# Patient Record
Sex: FEMALE | Race: WHITE | NOT HISPANIC OR LATINO | URBAN - METROPOLITAN AREA
[De-identification: names, ages, dates, MRNs, and addresses within clinical notes are randomized per-mention and may not be internally consistent; named-entity substitution may affect disease eponyms.]

---

## 2023-05-15 DIAGNOSIS — I15.9 SECONDARY HYPERTENSION: Primary | ICD-10-CM

## 2023-05-16 PROBLEM — F99 INSOMNIA DUE TO OTHER MENTAL DISORDER: Status: ACTIVE | Noted: 2023-05-16

## 2023-05-16 PROBLEM — M06.9 RHEUMATOID ARTHRITIS (MULTI): Status: ACTIVE | Noted: 2023-05-16

## 2023-05-16 PROBLEM — J30.2 SEASONAL ALLERGIES: Status: ACTIVE | Noted: 2023-05-16

## 2023-05-16 PROBLEM — K62.1 RECTAL POLYP: Status: ACTIVE | Noted: 2023-05-16

## 2023-05-16 PROBLEM — D75.89 MACROCYTOSIS: Status: ACTIVE | Noted: 2023-05-16

## 2023-05-16 PROBLEM — M81.0 OSTEOPOROSIS: Status: ACTIVE | Noted: 2023-05-16

## 2023-05-16 PROBLEM — I10 HYPERTENSION: Status: ACTIVE | Noted: 2023-05-16

## 2023-05-16 PROBLEM — M25.559 HIP PAIN: Status: ACTIVE | Noted: 2023-05-16

## 2023-05-16 PROBLEM — F51.05 INSOMNIA DUE TO OTHER MENTAL DISORDER: Status: ACTIVE | Noted: 2023-05-16

## 2023-05-16 RX ORDER — FLUTICASONE PROPIONATE 50 MCG
2 SPRAY, SUSPENSION (ML) NASAL DAILY
COMMUNITY
Start: 2021-10-25 | End: 2023-08-29 | Stop reason: SDUPTHER

## 2023-05-16 RX ORDER — CYCLOBENZAPRINE HCL 10 MG
10 TABLET ORAL NIGHTLY PRN
COMMUNITY
Start: 2022-06-07 | End: 2023-08-29

## 2023-05-16 RX ORDER — LOSARTAN POTASSIUM 50 MG/1
1 TABLET ORAL DAILY
COMMUNITY
Start: 2018-12-12 | End: 2023-06-06

## 2023-05-16 RX ORDER — HYDROCHLOROTHIAZIDE 25 MG/1
1 TABLET ORAL DAILY
COMMUNITY
Start: 2022-07-27 | End: 2023-08-29

## 2023-05-16 RX ORDER — AMLODIPINE BESYLATE 5 MG/1
1 TABLET ORAL DAILY
COMMUNITY
Start: 2018-06-27 | End: 2023-08-29 | Stop reason: SDUPTHER

## 2023-05-16 RX ORDER — AZELASTINE HCL 205.5 UG/1
SPRAY NASAL
COMMUNITY
Start: 2018-07-09 | End: 2023-08-29

## 2023-05-16 RX ORDER — AZELASTINE HYDROCHLORIDE 0.5 MG/ML
SOLUTION/ DROPS OPHTHALMIC EVERY 12 HOURS
COMMUNITY
Start: 2018-09-04 | End: 2023-08-29 | Stop reason: SDUPTHER

## 2023-05-16 RX ORDER — HYDROXYCHLOROQUINE SULFATE 200 MG/1
1 TABLET, FILM COATED ORAL DAILY
COMMUNITY

## 2023-05-16 RX ORDER — LEFLUNOMIDE 20 MG/1
TABLET ORAL
COMMUNITY
Start: 2018-10-02

## 2023-05-16 RX ORDER — MELOXICAM 15 MG/1
1 TABLET ORAL DAILY
COMMUNITY
Start: 2018-06-27 | End: 2023-06-06

## 2023-05-16 RX ORDER — ALBUTEROL SULFATE 90 UG/1
AEROSOL, METERED RESPIRATORY (INHALATION)
COMMUNITY
Start: 2018-09-04 | End: 2023-08-29 | Stop reason: SDUPTHER

## 2023-05-17 RX ORDER — AMLODIPINE BESYLATE 5 MG/1
5 TABLET ORAL DAILY
Qty: 90 TABLET | Refills: 0 | Status: SHIPPED | OUTPATIENT
Start: 2023-05-17 | End: 2023-08-09

## 2023-05-17 NOTE — TELEPHONE ENCOUNTER
Please schedule an appointment for review   of all med problems ...have sent in 90-day supply.  In the event that you cannot get in before this 90-day supply please call my office.Thanks much Dr. Juárez.

## 2023-06-06 DIAGNOSIS — M06.9 RHEUMATOID ARTHRITIS, INVOLVING UNSPECIFIED SITE, UNSPECIFIED WHETHER RHEUMATOID FACTOR PRESENT (MULTI): ICD-10-CM

## 2023-06-06 DIAGNOSIS — I10 HYPERTENSION, UNSPECIFIED TYPE: ICD-10-CM

## 2023-06-06 RX ORDER — MELOXICAM 15 MG/1
TABLET ORAL
Qty: 90 TABLET | Refills: 0 | Status: SHIPPED | OUTPATIENT
Start: 2023-06-06 | End: 2023-08-29 | Stop reason: SDUPTHER

## 2023-06-06 RX ORDER — LOSARTAN POTASSIUM 50 MG/1
TABLET ORAL
Qty: 90 TABLET | Refills: 0 | Status: SHIPPED | OUTPATIENT
Start: 2023-06-06 | End: 2023-08-29 | Stop reason: SDUPTHER

## 2023-06-07 NOTE — TELEPHONE ENCOUNTER
[facility] [phone]. [facility]. [facility]  
Please schedule an appointment for review   of all med problems ...have sent in 90-day supply.  In the event that you cannot get in before this 90-day supply please call my office.Thanks much Dr. Juárez.  
Patient expressed no known problems or needs

## 2023-06-08 DIAGNOSIS — M06.9 RHEUMATOID ARTHRITIS, INVOLVING UNSPECIFIED SITE, UNSPECIFIED WHETHER RHEUMATOID FACTOR PRESENT (MULTI): ICD-10-CM

## 2023-06-08 DIAGNOSIS — I10 HYPERTENSION, UNSPECIFIED TYPE: ICD-10-CM

## 2023-06-14 RX ORDER — MELOXICAM 15 MG/1
TABLET ORAL
Qty: 90 TABLET | Refills: 3 | OUTPATIENT
Start: 2023-06-14

## 2023-06-14 RX ORDER — LOSARTAN POTASSIUM 50 MG/1
TABLET ORAL
Qty: 90 TABLET | Refills: 3 | OUTPATIENT
Start: 2023-06-14

## 2023-06-14 NOTE — TELEPHONE ENCOUNTER
Called pt three times and lvmtcb about getting set up for an appt to review meds, there was another message where these refills were already approved by Dr. Juárez

## 2023-08-08 DIAGNOSIS — I15.9 SECONDARY HYPERTENSION: ICD-10-CM

## 2023-08-09 PROBLEM — M79.621 PAIN IN RIGHT UPPER ARM: Status: ACTIVE | Noted: 2022-06-07

## 2023-08-09 PROBLEM — M25.642 STIFFNESS OF LEFT HAND JOINT: Status: ACTIVE | Noted: 2017-02-13

## 2023-08-09 PROBLEM — J45.20 MILD INTERMITTENT ASTHMA WITHOUT COMPLICATION (HHS-HCC): Status: ACTIVE | Noted: 2017-08-31

## 2023-08-09 PROBLEM — M79.642 LEFT HAND PAIN: Status: ACTIVE | Noted: 2017-02-13

## 2023-08-09 PROBLEM — S46.211A STRAIN OF RIGHT BICEPS TENDON: Status: ACTIVE | Noted: 2022-06-07

## 2023-08-09 PROBLEM — G89.18 POST-OP PAIN: Status: ACTIVE | Noted: 2017-01-06

## 2023-08-09 PROBLEM — R29.898 WEAKNESS OF LEFT HAND: Status: ACTIVE | Noted: 2017-04-04

## 2023-08-09 RX ORDER — IBUPROFEN 100 MG/5ML
1000 SUSPENSION, ORAL (FINAL DOSE FORM) ORAL
COMMUNITY

## 2023-08-09 RX ORDER — CYANOCOBALAMIN (VITAMIN B-12) 250 MCG
TABLET ORAL
COMMUNITY

## 2023-08-09 RX ORDER — AMLODIPINE BESYLATE 5 MG/1
5 TABLET ORAL DAILY
Qty: 30 TABLET | Refills: 1 | Status: SHIPPED | OUTPATIENT
Start: 2023-08-09 | End: 2023-08-29 | Stop reason: SDUPTHER

## 2023-08-09 RX ORDER — BISOPROLOL FUMARATE AND HYDROCHLOROTHIAZIDE 2.5; 6.25 MG/1; MG/1
1 TABLET ORAL
COMMUNITY
End: 2023-08-29 | Stop reason: SDUPTHER

## 2023-08-09 NOTE — TELEPHONE ENCOUNTER
Please schedule an appointment for medicine is gone have sent in 30-day supply.  In the event that you cannot get in before this 30-day supply please call my office.Thanks much Dr. Juárez.

## 2023-08-10 NOTE — TELEPHONE ENCOUNTER
Called patient at [phone] 08/10 @ 9739 spoke w/ patient. She will CB to schedule. -LUIS ANTONIO

## 2023-08-21 DIAGNOSIS — I10 HYPERTENSION, UNSPECIFIED TYPE: ICD-10-CM

## 2023-08-21 DIAGNOSIS — M06.9 RHEUMATOID ARTHRITIS, INVOLVING UNSPECIFIED SITE, UNSPECIFIED WHETHER RHEUMATOID FACTOR PRESENT (MULTI): ICD-10-CM

## 2023-08-23 RX ORDER — MELOXICAM 15 MG/1
TABLET ORAL
Qty: 90 TABLET | Refills: 3 | OUTPATIENT
Start: 2023-08-23

## 2023-08-23 RX ORDER — LOSARTAN POTASSIUM 50 MG/1
TABLET ORAL
Qty: 90 TABLET | Refills: 3 | OUTPATIENT
Start: 2023-08-23

## 2023-08-28 ASSESSMENT — ENCOUNTER SYMPTOMS
HEADACHES: 0
SWEATS: 0
NECK PAIN: 0
ORTHOPNEA: 0
SHORTNESS OF BREATH: 0
BLURRED VISION: 0
HYPERTENSION: 1
PALPITATIONS: 0
PND: 0

## 2023-08-29 ENCOUNTER — OFFICE VISIT (OUTPATIENT)
Dept: PRIMARY CARE | Facility: CLINIC | Age: 63
End: 2023-08-29
Payer: COMMERCIAL

## 2023-08-29 ENCOUNTER — TELEPHONE (OUTPATIENT)
Dept: PRIMARY CARE | Facility: CLINIC | Age: 63
End: 2023-08-29

## 2023-08-29 VITALS
HEART RATE: 103 BPM | TEMPERATURE: 97.6 F | HEIGHT: 65 IN | DIASTOLIC BLOOD PRESSURE: 70 MMHG | WEIGHT: 117 LBS | BODY MASS INDEX: 19.49 KG/M2 | RESPIRATION RATE: 16 BRPM | SYSTOLIC BLOOD PRESSURE: 126 MMHG | OXYGEN SATURATION: 97 %

## 2023-08-29 DIAGNOSIS — Z12.31 BREAST CANCER SCREENING BY MAMMOGRAM: ICD-10-CM

## 2023-08-29 DIAGNOSIS — M06.9 RHEUMATOID ARTHRITIS, INVOLVING UNSPECIFIED SITE, UNSPECIFIED WHETHER RHEUMATOID FACTOR PRESENT (MULTI): ICD-10-CM

## 2023-08-29 DIAGNOSIS — F17.219 CIGARETTE NICOTINE DEPENDENCE WITH NICOTINE-INDUCED DISORDER: ICD-10-CM

## 2023-08-29 DIAGNOSIS — I10 HYPERTENSION, UNSPECIFIED TYPE: ICD-10-CM

## 2023-08-29 DIAGNOSIS — K62.1 RECTAL POLYP: Primary | ICD-10-CM

## 2023-08-29 DIAGNOSIS — J30.89 ALLERGIC RHINITIS DUE TO OTHER ALLERGIC TRIGGER, UNSPECIFIED SEASONALITY: ICD-10-CM

## 2023-08-29 PROBLEM — E87.1 HYPONATREMIA: Status: ACTIVE | Noted: 2023-08-29

## 2023-08-29 PROCEDURE — 3074F SYST BP LT 130 MM HG: CPT | Performed by: FAMILY MEDICINE

## 2023-08-29 PROCEDURE — 90471 IMMUNIZATION ADMIN: CPT | Performed by: FAMILY MEDICINE

## 2023-08-29 PROCEDURE — 99214 OFFICE O/P EST MOD 30 MIN: CPT | Performed by: FAMILY MEDICINE

## 2023-08-29 PROCEDURE — 90662 IIV NO PRSV INCREASED AG IM: CPT | Performed by: FAMILY MEDICINE

## 2023-08-29 PROCEDURE — 3078F DIAST BP <80 MM HG: CPT | Performed by: FAMILY MEDICINE

## 2023-08-29 RX ORDER — BISOPROLOL FUMARATE AND HYDROCHLOROTHIAZIDE 2.5; 6.25 MG/1; MG/1
1 TABLET ORAL
Qty: 90 TABLET | Refills: 2 | Status: SHIPPED | OUTPATIENT
Start: 2023-08-29 | End: 2023-08-30

## 2023-08-29 RX ORDER — LOSARTAN POTASSIUM 50 MG/1
50 TABLET ORAL DAILY
Qty: 90 TABLET | Refills: 2 | Status: SHIPPED | OUTPATIENT
Start: 2023-08-29 | End: 2023-08-30 | Stop reason: SDUPTHER

## 2023-08-29 RX ORDER — VARENICLINE TARTRATE 1 MG/1
1 TABLET, FILM COATED ORAL 2 TIMES DAILY
Qty: 180 TABLET | Refills: 0 | Status: SHIPPED | OUTPATIENT
Start: 2023-08-29 | End: 2023-08-29

## 2023-08-29 RX ORDER — ALBUTEROL SULFATE 90 UG/1
1 AEROSOL, METERED RESPIRATORY (INHALATION) EVERY 4 HOURS PRN
Qty: 18 G | Refills: 2 | Status: SHIPPED | OUTPATIENT
Start: 2023-08-29 | End: 2024-02-12

## 2023-08-29 RX ORDER — AMLODIPINE BESYLATE 5 MG/1
5 TABLET ORAL DAILY
Qty: 90 TABLET | Refills: 2 | Status: SHIPPED | OUTPATIENT
Start: 2023-08-29 | End: 2023-08-30 | Stop reason: SDUPTHER

## 2023-08-29 RX ORDER — MELOXICAM 15 MG/1
15 TABLET ORAL DAILY
Qty: 90 TABLET | Refills: 2 | Status: SHIPPED | OUTPATIENT
Start: 2023-08-29 | End: 2024-04-22

## 2023-08-29 RX ORDER — VARENICLINE TARTRATE 1 MG/1
1 TABLET, FILM COATED ORAL 2 TIMES DAILY
Qty: 180 TABLET | Refills: 0 | Status: SHIPPED | OUTPATIENT
Start: 2023-08-29 | End: 2023-10-30 | Stop reason: SDUPTHER

## 2023-08-29 RX ORDER — HYDROCHLOROTHIAZIDE 25 MG/1
25 TABLET ORAL DAILY
Qty: 90 TABLET | Refills: 1 | Status: SHIPPED | OUTPATIENT
Start: 2023-08-29 | End: 2023-08-30 | Stop reason: SDUPTHER

## 2023-08-29 RX ORDER — AZELASTINE HYDROCHLORIDE 0.5 MG/ML
1 SOLUTION/ DROPS OPHTHALMIC 2 TIMES DAILY
Qty: 3 ML | Refills: 2 | Status: SHIPPED | OUTPATIENT
Start: 2023-08-29 | End: 2024-05-20 | Stop reason: SDUPTHER

## 2023-08-29 RX ORDER — VARENICLINE TARTRATE 0.5 (11)-1
0.5 KIT ORAL 2 TIMES DAILY
Qty: 60 EACH | Refills: 1 | Status: SHIPPED | OUTPATIENT
Start: 2023-08-29 | End: 2023-10-30 | Stop reason: SDUPTHER

## 2023-08-29 RX ORDER — FLUTICASONE PROPIONATE 50 MCG
2 SPRAY, SUSPENSION (ML) NASAL DAILY
Qty: 16 G | Refills: 2 | Status: SHIPPED | OUTPATIENT
Start: 2023-08-29 | End: 2024-02-13 | Stop reason: SDUPTHER

## 2023-08-29 ASSESSMENT — ENCOUNTER SYMPTOMS
ORTHOPNEA: 0
SWEATS: 0
BLURRED VISION: 0
NECK PAIN: 0
PALPITATIONS: 0
SHORTNESS OF BREATH: 0
HEADACHES: 0
PND: 0
HYPERTENSION: 1

## 2023-08-29 NOTE — ASSESSMENT & PLAN NOTE
Blood pressure and with use of meds has good numbers at home 120/80 does admit to whitecoat but stable

## 2023-08-29 NOTE — ASSESSMENT & PLAN NOTE
Discussed low sodium as a complication of water pill however minimal and encouraged we will monitor

## 2023-08-29 NOTE — TELEPHONE ENCOUNTER
Pt said in office you told her NOT to stop taking the hydrochlorothiazide 25mg  But on her visit summary it says to stop taking that medication and you didn't refill the medication either  Pt is confused if she is to continue taking this medication or not, please advise, thanks!

## 2023-08-29 NOTE — PROGRESS NOTES
"Subjective   Patient ID: Lana Piña is a 63 y.o. female who presents for Hypertension.    Hypertension  This is a recurrent problem. The current episode started more than 1 year ago. The problem has been waxing and waning since onset. Pertinent negatives include no anxiety, blurred vision, chest pain, headaches, malaise/fatigue, neck pain, orthopnea, palpitations, peripheral edema, PND, shortness of breath or sweats. Agents associated with hypertension include decongestants and NSAIDs. Risk factors for coronary artery disease include family history, post-menopausal state, smoking/tobacco exposure and stress. The current treatment provides moderate improvement. There are no compliance problems.    Allergies and uses fluticasone and medication helps and would like refill it without meds he has increased symptoms    Continues to smoke and would like help with quitting.  Believes she is other 20-pack-year history not interested in CT at this time.    Review of Systems   Constitutional:  Negative for malaise/fatigue.   Eyes:  Negative for blurred vision.   Respiratory:  Negative for shortness of breath.    Cardiovascular:  Negative for chest pain, palpitations, orthopnea and PND.   Musculoskeletal:  Negative for neck pain.   Neurological:  Negative for headaches.       Objective   /70   Pulse 103   Temp 36.4 °C (97.6 °F)   Resp 16   Ht 1.651 m (5' 5\")   Wt 53.1 kg (117 lb)   SpO2 97%   BMI 19.47 kg/m²     Physical Exam  general: alert oriented x three  HEENT hearing normal to voice  Neck supple  Lungs respirations non-labored.  Cardiovascular: no peripheral edema  Skin: warm and dry without rash  Psych: judgement and insight normal  Musculoskeletal:  ambulation normal,    lymph:negative cervical  LYMPADENOPATHY  thyroid: non palpable enlargementv   Assessment/Plan   Problem List Items Addressed This Visit       Hypertension     Blood pressure and with use of meds has good numbers at home 120/80 does " admit to whitecoat but stable         Relevant Medications    losartan (Cozaar) 50 mg tablet    bisoproloL-hydrochlorothiazide (Ziac) 2.5-6.25 mg tablet    amLODIPine (Norvasc) 5 mg tablet    Other Relevant Orders    Lipid Panel    Rectal polyp - Primary    Relevant Orders    Colonoscopy Screening    Rheumatoid arthritis (CMS/HCC)     Per dr gonzalez         Relevant Medications    meloxicam (Mobic) 15 mg tablet    Allergic rhinitis     Stable with use of fluticasone advised to continue medication         Relevant Medications    fluticasone (Flonase) 50 mcg/actuation nasal spray    azelastine (Optivar) 0.05 % ophthalmic solution    albuterol 90 mcg/actuation inhaler    Cigarette nicotine dependence with nicotine-induced disorder     Discussed and patient interested in smoking pill we will try Chantix         Relevant Medications    varenicline (Chantix Starting Month Box) 0.5 mg (11)- 1 mg (42) tablet    varenicline (Chantix Continuing Month Box) 1 mg tablet     Other Visit Diagnoses       Breast cancer screening by mammogram        Relevant Orders    BI mammo bilateral screening tomosynthesis

## 2023-08-29 NOTE — PATIENT INSTRUCTIONS

## 2023-08-30 RX ORDER — LOSARTAN POTASSIUM 50 MG/1
50 TABLET ORAL DAILY
Qty: 90 TABLET | Refills: 2 | Status: SHIPPED | OUTPATIENT
Start: 2023-08-30 | End: 2023-10-30 | Stop reason: SDUPTHER

## 2023-08-30 RX ORDER — AMLODIPINE BESYLATE 5 MG/1
5 TABLET ORAL DAILY
Qty: 90 TABLET | Refills: 2 | Status: SHIPPED | OUTPATIENT
Start: 2023-08-30 | End: 2023-10-30 | Stop reason: SDUPTHER

## 2023-08-30 RX ORDER — HYDROCHLOROTHIAZIDE 25 MG/1
25 TABLET ORAL DAILY
Qty: 90 TABLET | Refills: 2 | Status: SHIPPED | OUTPATIENT
Start: 2023-08-30 | End: 2024-04-22

## 2023-08-30 NOTE — TELEPHONE ENCOUNTER
Pt said this medication was not refilled at her appt yesterday  Please assist  Rx Refill Request Telephone Encounter    Name:  Lana Piña  :  [id number]  Medication Name:  Mobic  15mg  Oral  Daily  90  Specific Pharmacy location:  [facility]  Date of last appointment:  2023  Date of next appointment:  2023  Best number to reach patient:  [phone]

## 2023-10-30 DIAGNOSIS — I10 HYPERTENSION, UNSPECIFIED TYPE: ICD-10-CM

## 2023-10-30 DIAGNOSIS — F17.219 CIGARETTE NICOTINE DEPENDENCE WITH NICOTINE-INDUCED DISORDER: ICD-10-CM

## 2023-10-30 RX ORDER — AMLODIPINE BESYLATE 5 MG/1
5 TABLET ORAL DAILY
Qty: 90 TABLET | Refills: 2 | Status: SHIPPED | OUTPATIENT
Start: 2023-10-30 | End: 2024-05-20 | Stop reason: SDUPTHER

## 2023-10-30 RX ORDER — VARENICLINE TARTRATE 0.5 (11)-1
0.5 KIT ORAL 2 TIMES DAILY
Qty: 60 EACH | Refills: 1 | Status: SHIPPED | OUTPATIENT
Start: 2023-10-30

## 2023-10-30 RX ORDER — VARENICLINE TARTRATE 1 MG/1
1 TABLET, FILM COATED ORAL 2 TIMES DAILY
Qty: 180 TABLET | Refills: 0 | Status: SHIPPED | OUTPATIENT
Start: 2023-10-30

## 2023-10-30 RX ORDER — LOSARTAN POTASSIUM 50 MG/1
50 TABLET ORAL DAILY
Qty: 90 TABLET | Refills: 2 | Status: SHIPPED | OUTPATIENT
Start: 2023-10-30 | End: 2024-05-20 | Stop reason: SDUPTHER

## 2023-10-30 NOTE — TELEPHONE ENCOUNTER
Pt called stating these med's never reached [facility] when they were sent last - Requesting you please resend so she doesn't run out

## 2023-11-01 ENCOUNTER — TELEPHONE (OUTPATIENT)
Dept: PRIMARY CARE | Facility: CLINIC | Age: 63
End: 2023-11-01
Payer: COMMERCIAL

## 2023-11-01 NOTE — TELEPHONE ENCOUNTER
[facility] requesting clarification on pt's directions for Chantix Starter pack. Stating the standard dose is different then prescribed  They say the standard dosing is 0.5mg once daily days 1-3, then 0.5mg BID days 4-7 then 1mg BID starting day 8   Please resend with Clarification

## 2024-02-11 DIAGNOSIS — J30.89 ALLERGIC RHINITIS DUE TO OTHER ALLERGIC TRIGGER, UNSPECIFIED SEASONALITY: ICD-10-CM

## 2024-02-12 RX ORDER — ALBUTEROL SULFATE 90 UG/1
AEROSOL, METERED RESPIRATORY (INHALATION)
Qty: 25.5 G | Refills: 3 | Status: SHIPPED | OUTPATIENT
Start: 2024-02-12 | End: 2024-06-06 | Stop reason: SDUPTHER

## 2024-02-13 DIAGNOSIS — J30.89 ALLERGIC RHINITIS DUE TO OTHER ALLERGIC TRIGGER, UNSPECIFIED SEASONALITY: ICD-10-CM

## 2024-02-13 RX ORDER — FLUTICASONE PROPIONATE 50 MCG
2 SPRAY, SUSPENSION (ML) NASAL DAILY
Qty: 16 G | Refills: 2 | Status: SHIPPED | OUTPATIENT
Start: 2024-02-13 | End: 2024-05-20 | Stop reason: SDUPTHER

## 2024-04-08 ENCOUNTER — APPOINTMENT (OUTPATIENT)
Dept: PRIMARY CARE | Facility: CLINIC | Age: 64
End: 2024-04-08
Payer: COMMERCIAL

## 2024-04-10 ENCOUNTER — APPOINTMENT (OUTPATIENT)
Dept: PRIMARY CARE | Facility: CLINIC | Age: 64
End: 2024-04-10
Payer: COMMERCIAL

## 2024-04-21 DIAGNOSIS — I10 HYPERTENSION, UNSPECIFIED TYPE: ICD-10-CM

## 2024-04-21 DIAGNOSIS — M06.9 RHEUMATOID ARTHRITIS, INVOLVING UNSPECIFIED SITE, UNSPECIFIED WHETHER RHEUMATOID FACTOR PRESENT (MULTI): ICD-10-CM

## 2024-04-22 ENCOUNTER — APPOINTMENT (OUTPATIENT)
Dept: PRIMARY CARE | Facility: CLINIC | Age: 64
End: 2024-04-22
Payer: COMMERCIAL

## 2024-04-22 RX ORDER — MELOXICAM 15 MG/1
15 TABLET ORAL DAILY
Qty: 90 TABLET | Refills: 1 | Status: SHIPPED | OUTPATIENT
Start: 2024-04-22 | End: 2024-05-20 | Stop reason: SDUPTHER

## 2024-04-22 RX ORDER — HYDROCHLOROTHIAZIDE 25 MG/1
25 TABLET ORAL DAILY
Qty: 90 TABLET | Refills: 1 | Status: SHIPPED | OUTPATIENT
Start: 2024-04-22 | End: 2024-05-20 | Stop reason: SDUPTHER

## 2024-05-17 ASSESSMENT — ENCOUNTER SYMPTOMS
PND: 0
SHORTNESS OF BREATH: 0
BLURRED VISION: 0
HYPERTENSION: 1
ORTHOPNEA: 0
PALPITATIONS: 0
NECK PAIN: 0
HEADACHES: 0
SWEATS: 0

## 2024-05-20 ENCOUNTER — OFFICE VISIT (OUTPATIENT)
Dept: PRIMARY CARE | Facility: CLINIC | Age: 64
End: 2024-05-20
Payer: COMMERCIAL

## 2024-05-20 VITALS
RESPIRATION RATE: 18 BRPM | BODY MASS INDEX: 20.49 KG/M2 | WEIGHT: 123 LBS | OXYGEN SATURATION: 95 % | DIASTOLIC BLOOD PRESSURE: 103 MMHG | SYSTOLIC BLOOD PRESSURE: 177 MMHG | HEIGHT: 65 IN | TEMPERATURE: 97 F | HEART RATE: 112 BPM

## 2024-05-20 DIAGNOSIS — J30.89 ALLERGIC RHINITIS DUE TO OTHER ALLERGIC TRIGGER, UNSPECIFIED SEASONALITY: ICD-10-CM

## 2024-05-20 DIAGNOSIS — M81.0 OSTEOPOROSIS, UNSPECIFIED OSTEOPOROSIS TYPE, UNSPECIFIED PATHOLOGICAL FRACTURE PRESENCE: Primary | ICD-10-CM

## 2024-05-20 DIAGNOSIS — I10 HYPERTENSION, UNSPECIFIED TYPE: ICD-10-CM

## 2024-05-20 DIAGNOSIS — J45.909 ASTHMA, UNSPECIFIED ASTHMA SEVERITY, UNSPECIFIED WHETHER COMPLICATED, UNSPECIFIED WHETHER PERSISTENT (HHS-HCC): ICD-10-CM

## 2024-05-20 DIAGNOSIS — M06.9 RHEUMATOID ARTHRITIS, INVOLVING UNSPECIFIED SITE, UNSPECIFIED WHETHER RHEUMATOID FACTOR PRESENT (MULTI): ICD-10-CM

## 2024-05-20 DIAGNOSIS — M48.061 SPINAL STENOSIS OF LUMBAR REGION WITHOUT NEUROGENIC CLAUDICATION: ICD-10-CM

## 2024-05-20 PROBLEM — M19.041 ARTHRITIS OF HAND, RIGHT: Status: ACTIVE | Noted: 2023-09-13

## 2024-05-20 PROBLEM — F17.210 CIGARETTE SMOKER: Status: ACTIVE | Noted: 2023-08-25

## 2024-05-20 PROCEDURE — 1159F MED LIST DOCD IN RCRD: CPT | Performed by: FAMILY MEDICINE

## 2024-05-20 PROCEDURE — 4004F PT TOBACCO SCREEN RCVD TLK: CPT | Performed by: FAMILY MEDICINE

## 2024-05-20 PROCEDURE — 99214 OFFICE O/P EST MOD 30 MIN: CPT | Performed by: FAMILY MEDICINE

## 2024-05-20 PROCEDURE — 3077F SYST BP >= 140 MM HG: CPT | Performed by: FAMILY MEDICINE

## 2024-05-20 PROCEDURE — 3080F DIAST BP >= 90 MM HG: CPT | Performed by: FAMILY MEDICINE

## 2024-05-20 RX ORDER — AZELASTINE HYDROCHLORIDE 0.5 MG/ML
1 SOLUTION/ DROPS OPHTHALMIC 2 TIMES DAILY
Qty: 3 ML | Refills: 3 | Status: SHIPPED | OUTPATIENT
Start: 2024-05-20

## 2024-05-20 RX ORDER — METHOCARBAMOL 750 MG/1
750 TABLET, FILM COATED ORAL 3 TIMES DAILY
Qty: 90 TABLET | Refills: 0 | Status: SHIPPED | OUTPATIENT
Start: 2024-05-20 | End: 2024-06-19

## 2024-05-20 RX ORDER — FLUTICASONE PROPIONATE 50 MCG
2 SPRAY, SUSPENSION (ML) NASAL DAILY
Qty: 16 G | Refills: 3 | Status: SHIPPED | OUTPATIENT
Start: 2024-05-20

## 2024-05-20 RX ORDER — AMLODIPINE BESYLATE 5 MG/1
5 TABLET ORAL 2 TIMES DAILY
Qty: 180 TABLET | Refills: 3 | Status: SHIPPED | OUTPATIENT
Start: 2024-05-20

## 2024-05-20 RX ORDER — AMLODIPINE BESYLATE 5 MG/1
5 TABLET ORAL DAILY
Qty: 90 TABLET | Refills: 3 | Status: SHIPPED | OUTPATIENT
Start: 2024-05-20 | End: 2024-05-20

## 2024-05-20 RX ORDER — HYDROCHLOROTHIAZIDE 25 MG/1
25 TABLET ORAL DAILY
Qty: 90 TABLET | Refills: 3 | Status: SHIPPED | OUTPATIENT
Start: 2024-05-20

## 2024-05-20 RX ORDER — MELOXICAM 15 MG/1
15 TABLET ORAL DAILY
Qty: 90 TABLET | Refills: 3 | Status: SHIPPED | OUTPATIENT
Start: 2024-05-20

## 2024-05-20 RX ORDER — METHOCARBAMOL 750 MG/1
750 TABLET, FILM COATED ORAL 4 TIMES DAILY
Qty: 40 TABLET | Refills: 0 | Status: SHIPPED | OUTPATIENT
Start: 2024-05-20 | End: 2024-05-20

## 2024-05-20 RX ORDER — LOSARTAN POTASSIUM 50 MG/1
100 TABLET ORAL DAILY
Qty: 90 TABLET | Refills: 3 | Status: SHIPPED | OUTPATIENT
Start: 2024-05-20

## 2024-05-20 RX ORDER — LOSARTAN POTASSIUM 50 MG/1
50 TABLET ORAL DAILY
Qty: 90 TABLET | Refills: 3 | Status: SHIPPED | OUTPATIENT
Start: 2024-05-20 | End: 2024-05-20

## 2024-05-20 RX ORDER — METHYLPREDNISOLONE 4 MG/1
TABLET ORAL
Qty: 21 TABLET | Refills: 0 | Status: SHIPPED | OUTPATIENT
Start: 2024-05-20

## 2024-05-20 ASSESSMENT — ENCOUNTER SYMPTOMS
PALPITATIONS: 0
SHORTNESS OF BREATH: 0
HEADACHES: 0
NECK PAIN: 0
SWEATS: 0
HYPERTENSION: 1
BLURRED VISION: 0
ORTHOPNEA: 0
PND: 0

## 2024-05-20 NOTE — PROGRESS NOTES
Answers submitted by the patient for this visit:  High Blood Pressure Questionnaire (Submitted on 5/17/2024)  Chief Complaint: Hypertension  Chronicity: recurrent  Onset: more than 1 year ago  Progression since onset: waxing and waning  Condition status: uncontrolled  anxiety: Yes  blurred vision: No  chest pain: No  headaches: No  malaise/fatigue: No  neck pain: No  orthopnea: No  palpitations: No  peripheral edema: No  PND: No  shortness of breath: No  sweats: No  Agents associated with hypertension: NSAIDs, steroids  CAD risks: family history, post-menopausal state, smoking/tobacco exposure, stress  Compliance problems: no compliance problems

## 2024-05-20 NOTE — ASSESSMENT & PLAN NOTE
Try ice to lumbar spine use Karen in freezer bag and try 20 minutes 3 times daily.    In addition we will repeat Medrol Dosepak and discussed back strengthening exercises.  Will also continue Robaxin until seen by pain management

## 2024-05-20 NOTE — ASSESSMENT & PLAN NOTE
Patient is also here for follow-up of hypertension.. Symptoms do not include headache confusion visual disturbance dizziness shortness of breath chest pain syncope or palpitations. Recent blood pressure patient has   been checking... usually   138/-140// By report there is good compliance with treatment. Pertinent medical history includes asthma..  I increased the losartan to 100 mg daily and after 2 to 4 weeks no changes or slight decrease but not at 130/80 then increase amlodipine to 5 mg twice daily

## 2024-05-20 NOTE — PROGRESS NOTES
Subjective   Patient ID: Lana Piña is a 64 y.o. female who presents for Follow-up (EKG refused/).  Hypertension  This is a recurrent problem. The current episode started more than 1 year ago. The problem has been waxing and waning since onset. The problem is uncontrolled. Associated symptoms include anxiety. Pertinent negatives include no blurred vision, chest pain, headaches, malaise/fatigue, neck pain, orthopnea, palpitations, peripheral edema, PND, shortness of breath or sweats. Agents associated with hypertension include NSAIDs and steroids. Risk factors for coronary artery disease include family history, post-menopausal state, smoking/tobacco exposure and stress. There are no compliance problems.       Patient is also here for follow-up of hypertension.. Symptoms do not include headache confusion visual disturbance dizziness shortness of breath chest pain syncope or palpitations. Recent blood pressure patient has   been checking... usually   138/-140// By report there is good compliance with treatment. Pertinent medical history includes asthma..  Back   pain is severe      low back   on  medrol  .. [place] is last day. Robaxin  helped .. Not going to  pt  ..  Has spine doctor appoinmnet ..   Had  incontinent of urine  .. Slight leakage   ... No    stool  leakage      Review of Systems   Constitutional:  Negative for malaise/fatigue.   Eyes:  Negative for blurred vision.   Respiratory:  Negative for shortness of breath.    Cardiovascular:  Negative for chest pain, palpitations, orthopnea and PND.   Musculoskeletal:  Negative for neck pain.   Neurological:  Negative for headaches.     All other  pertinent  systems reviewed and are negative except  those  mentioned  in HPI   Objective   Physical Exam  Vitals: I have reviewed the vitals  General: Well-developed.  In no acute distress.  Eyes:   sclera nonicteric.  Conjunctiva not injected.  No discharge.   HEAD: Normocephalic, atraumatic.  HEENT   Mucous  membranes moist.  Posterior oropharynx nonerythematous, no tonsillar exudates.      No cervical lymphadenopathy.  Cardio: Regular rate and rhythm.  No murmur, rub or gallop.  Pulmonary: Lungs clear to auscultation in all fields.  No accessory muscle use.  GI/: Normal active bowel sounds.  Soft, nontender.  No masses or organomegaly appreciated.  Musculoskeletal: No gross deformities appreciated. BACK TENEDERNESS OF LUMBAR SPINE   Neuro: Alert, age-appropriate.  Normal muscle tone.  Moving all extremities.  Skin: No rash, bruises or lesions.   Labs  Last 12 months            Assessment/Plan   Problem List Items Addressed This Visit       Hypertension      Patient is also here for follow-up of hypertension.. Symptoms do not include headache confusion visual disturbance dizziness shortness of breath chest pain syncope or palpitations. Recent blood pressure patient has   been checking... usually   138/-140// By report there is good compliance with treatment. Pertinent medical history includes asthma..  I increased the losartan to 100 mg daily and after 2 to 4 weeks no changes or slight decrease but not at 130/80 then increase amlodipine to 5 mg twice daily         Relevant Medications    hydroCHLOROthiazide (HYDRODiuril) 25 mg tablet    amLODIPine (Norvasc) 5 mg tablet    losartan (Cozaar) 50 mg tablet    Osteoporosis - Primary    Rheumatoid arthritis (Multi)    Relevant Medications    meloxicam (Mobic) 15 mg tablet    methylPREDNISolone (Medrol Dospak) 4 mg tablets    Allergic rhinitis    Relevant Medications    fluticasone (Flonase) 50 mcg/actuation nasal spray    azelastine (Optivar) 0.05 % ophthalmic solution    Asthma (HHS-HCC)    Spinal stenosis of lumbar region without neurogenic claudication     Try ice to lumbar spine use Karen in freezer bag and try 20 minutes 3 times daily.    In addition we will repeat Medrol Dosepak and discussed back strengthening exercises.  Will also continue Robaxin until seen by pain  management         Relevant Medications    methocarbamol (Robaxin) 750 mg tablet     Recommendations will be to increase losartan to 100 mg.  Monitor blood pressure next 2 to 3 weeks if blood pressure fails to improve then continue losartan 100 but he had 5 mg of amlodipine twice daily.

## 2024-06-06 DIAGNOSIS — J30.89 ALLERGIC RHINITIS DUE TO OTHER ALLERGIC TRIGGER, UNSPECIFIED SEASONALITY: ICD-10-CM

## 2024-06-06 RX ORDER — ALBUTEROL SULFATE 90 UG/1
1 AEROSOL, METERED RESPIRATORY (INHALATION) EVERY 4 HOURS PRN
Qty: 25.5 G | Refills: 3 | Status: SHIPPED | OUTPATIENT
Start: 2024-06-06

## 2024-06-07 NOTE — PROGRESS NOTES
Advised patient I sent prescription for albuterol to mail away pharmacy however in light of her back being..  A complicated issue spine and Dr. De La Torre and team will be most appropriate and sending long-term meds so that they are aware of how painful you are/pain relieved with treatment and therefore please get muscle relaxant Robaxin and other meds from back surgery team

## 2024-08-12 ENCOUNTER — APPOINTMENT (OUTPATIENT)
Dept: PRIMARY CARE | Facility: CLINIC | Age: 64
End: 2024-08-12
Payer: COMMERCIAL

## 2024-08-12 VITALS
BODY MASS INDEX: 20.66 KG/M2 | HEART RATE: 100 BPM | HEIGHT: 65 IN | WEIGHT: 124 LBS | DIASTOLIC BLOOD PRESSURE: 92 MMHG | SYSTOLIC BLOOD PRESSURE: 154 MMHG | OXYGEN SATURATION: 98 % | TEMPERATURE: 97 F | RESPIRATION RATE: 18 BRPM

## 2024-08-12 DIAGNOSIS — M06.9 RHEUMATOID ARTHRITIS INVOLVING BOTH FEET, UNSPECIFIED WHETHER RHEUMATOID FACTOR PRESENT (MULTI): ICD-10-CM

## 2024-08-12 DIAGNOSIS — I10 HYPERTENSION, UNSPECIFIED TYPE: ICD-10-CM

## 2024-08-12 DIAGNOSIS — D50.8 OTHER IRON DEFICIENCY ANEMIA: Primary | ICD-10-CM

## 2024-08-12 DIAGNOSIS — M06.9 RHEUMATOID ARTHRITIS, INVOLVING UNSPECIFIED SITE, UNSPECIFIED WHETHER RHEUMATOID FACTOR PRESENT (MULTI): ICD-10-CM

## 2024-08-12 DIAGNOSIS — J30.89 ALLERGIC RHINITIS DUE TO OTHER ALLERGIC TRIGGER, UNSPECIFIED SEASONALITY: ICD-10-CM

## 2024-08-12 DIAGNOSIS — M48.061 SPINAL STENOSIS OF LUMBAR REGION WITHOUT NEUROGENIC CLAUDICATION: ICD-10-CM

## 2024-08-12 PROBLEM — D50.9 IRON DEFICIENCY ANEMIA: Status: ACTIVE | Noted: 2024-08-12

## 2024-08-12 PROCEDURE — 1036F TOBACCO NON-USER: CPT | Performed by: FAMILY MEDICINE

## 2024-08-12 PROCEDURE — 1160F RVW MEDS BY RX/DR IN RCRD: CPT | Performed by: FAMILY MEDICINE

## 2024-08-12 PROCEDURE — 90471 IMMUNIZATION ADMIN: CPT | Performed by: FAMILY MEDICINE

## 2024-08-12 PROCEDURE — 99214 OFFICE O/P EST MOD 30 MIN: CPT | Performed by: FAMILY MEDICINE

## 2024-08-12 PROCEDURE — 1159F MED LIST DOCD IN RCRD: CPT | Performed by: FAMILY MEDICINE

## 2024-08-12 PROCEDURE — 90677 PCV20 VACCINE IM: CPT | Performed by: FAMILY MEDICINE

## 2024-08-12 PROCEDURE — 3077F SYST BP >= 140 MM HG: CPT | Performed by: FAMILY MEDICINE

## 2024-08-12 PROCEDURE — 3079F DIAST BP 80-89 MM HG: CPT | Performed by: FAMILY MEDICINE

## 2024-08-12 PROCEDURE — 3008F BODY MASS INDEX DOCD: CPT | Performed by: FAMILY MEDICINE

## 2024-08-12 RX ORDER — METHOCARBAMOL 500 MG/1
500 TABLET, FILM COATED ORAL 4 TIMES DAILY
COMMUNITY

## 2024-08-12 RX ORDER — MELOXICAM 15 MG/1
15 TABLET ORAL DAILY
Qty: 90 TABLET | Refills: 3 | Status: CANCELLED | OUTPATIENT
Start: 2024-08-12

## 2024-08-12 RX ORDER — HYDROCHLOROTHIAZIDE 25 MG/1
25 TABLET ORAL DAILY
Qty: 90 TABLET | Refills: 3 | Status: SHIPPED | OUTPATIENT
Start: 2024-08-12

## 2024-08-12 RX ORDER — FLUTICASONE PROPIONATE 50 MCG
2 SPRAY, SUSPENSION (ML) NASAL DAILY
Qty: 16 G | Refills: 3 | Status: SHIPPED | OUTPATIENT
Start: 2024-08-12

## 2024-08-12 RX ORDER — AZELASTINE HYDROCHLORIDE 0.5 MG/ML
1 SOLUTION/ DROPS OPHTHALMIC 2 TIMES DAILY
Qty: 3 ML | Refills: 3 | Status: SHIPPED | OUTPATIENT
Start: 2024-08-12

## 2024-08-12 RX ORDER — LOSARTAN POTASSIUM 50 MG/1
100 TABLET ORAL DAILY
Qty: 180 TABLET | Refills: 3 | Status: SHIPPED | OUTPATIENT
Start: 2024-08-12

## 2024-08-12 RX ORDER — AMLODIPINE BESYLATE 5 MG/1
5 TABLET ORAL 2 TIMES DAILY
Qty: 180 TABLET | Refills: 3 | Status: SHIPPED | OUTPATIENT
Start: 2024-08-12

## 2024-08-12 RX ORDER — FERROUS SULFATE 325(65) MG
1 TABLET ORAL
Qty: 30 TABLET | Refills: 3 | Status: SHIPPED | OUTPATIENT
Start: 2024-08-12

## 2024-08-12 RX ORDER — DULOXETIN HYDROCHLORIDE 20 MG/1
20 CAPSULE, DELAYED RELEASE ORAL DAILY
COMMUNITY

## 2024-08-12 NOTE — ASSESSMENT & PLAN NOTE
Hold Cozaar morning of surgery  Discontinue all aspirin, vitamins, anti-inflammatories, 1 week before surgery    Reviewed  and low risk for  intended procedure  ..  Avoid  nsaids  in light of  anemia

## 2024-08-12 NOTE — ASSESSMENT & PLAN NOTE
Reviewed labs and advised to take vitamin C along with Feosol over-the-counter 1 tablet twice weekly increase iron in diet with broccoli/eggs/spinach/

## 2024-08-12 NOTE — PROGRESS NOTES
" Subjective   Lana Piña is a 64 y.o. female who is here for a routine exam.       patient is being seen for a preoperative visit.  The procedure   for ALIF DECOMPRESSION LAMINECTOMY INTERBODY FUSION LUMBAR LEVEL 1  8 / 16 /2024 with Dr. Laureano  Amps her   notes  indicate\" the patient reported back, hip, and leg pain. She expresses feeling that her lower back is collapsing on itself, as well as feeling an electric shooting pain through her hips and down to her legs. It hurts to sit, stand, lie down, and it is very difficult for her to wake up in the morning, stating it takes abut two hours for her to get out of bed.  In the past, she has had injections for her pain, but they have not proven to be helpful, having negative reactions from them. .   Addition\"the patient reports that she still hurts across her lower back, stating her pain is currently radiating from her hips down to the outside of both her legs. Her legs feel very tight and sore at this time, but she is keeping active in spite of this, doing a lot of garden work at this time. \"    Mri  showed..  Spondylosis/spondylolisthesis with severe canal stenosis at L3-4, and   multilevel neural foraminal narrowing severe at L3-4 bilaterally.   L5 spondylolysis with grade 2 anterolisthesis of L5 on S1, however with   bony fusion across the L5-S1 interspace.   Small edema at the anterior superior endplate of L5 which may indicate a   small subacute on chronic compression deformity.  I n  light of this she  is going  for v  decompreesion  laminectomy    .  She has hypertension  .. Well controlled .. Rheumatoid arthritis  .. Treated with leflunomide  with good results..              Active Problem List      Comprehensive Medical/Surgical/Social/Family History  Past Medical History:   Diagnosis Date    Personal history of diseases of the skin and subcutaneous tissue     History of cellulitis    Personal history of other diseases of the digestive system     " History of irritable bowel syndrome    Personal history of other endocrine, nutritional and metabolic disease     History of hypothyroidism    Personal history of urinary (tract) infections     History of chronic urinary tract infection   Pertinent past medical history: Pulmonary embolism, DVT, diabetes, neck osteoarthritis, wearing denture, seizure disorder, CVA, asthma, COPD, obstructive sleep apnea, and renal disease denies..          Exercise capacity: Able to walk 4 blocks without symptoms and able to walk 2 flights of stairs without symptoms.    REVISED CARDIAC RISK INDEX  - Elevated-risk surgery (Intraperitoneal; intrathoracic; suprainguinal vascular): no   - History of ischemic heart disease (History of myocardial infarction (MI); history of positive exercise test; current chest pain considered due to myocardial ischemia; use of nitrate therapy or ECG with pathological Q waves): no  - History of congestive heart failure (Pulmonary edema, bilateral rales or S3 gallop; paroxysmal nocturnal dyspnea; chest x-ray (CXR) showing pulmonary vascular redistribution): no  History of cerebrovascular disease (Prior transient ischemic attack (TIA) or stroke): no  - Pre-operative treatment with insulin: no  - Pre-operative creatinine >2 mg/dL / 176.8 µmol/L: no  Total Score is: 0    Past Surgical History:   Procedure Laterality Date    OTHER SURGICAL HISTORY  07/10/2019    Tonsillectomy    OTHER SURGICAL HISTORY  07/10/2019    Hysterectomy    OTHER SURGICAL HISTORY  07/10/2019    Hand surgery     Social History     Social History Narrative    Not on file         Allergies and Medications  Sulfasalazine, Aspirin, Loracarbef, Meperidine, Penicillins, and Sulfa (sulfonamide antibiotics)  Current Outpatient Medications on File Prior to Visit   Medication Sig Dispense Refill    albuterol 90 mcg/actuation inhaler Inhale 1 puff every 4 hours if needed for wheezing (Wheezing). 25.5 g 3    ascorbic acid (Vitamin C) 1,000 mg tablet  Take 1 tablet (1,000 mg) by mouth once daily.      cyanocobalamin (Vitamin B-12) 250 mcg tablet Take by mouth once daily.      DULoxetine (Cymbalta) 20 mg DR capsule Take 1 capsule (20 mg) by mouth once daily.      meloxicam (Mobic) 15 mg tablet Take 1 tablet (15 mg) by mouth once daily. 90 tablet 3    methocarbamol (Robaxin) 500 mg tablet Take 1 tablet (500 mg) by mouth 4 times a day.      methocarbamol (Robaxin) 750 mg tablet Take 1 tablet (750 mg) by mouth 3 times a day. 90 tablet 0    methylPREDNISolone (Medrol Dospak) 4 mg tablets Follow schedule on package instructions (Patient not taking: Reported on 8/12/2024) 21 tablet 0    varenicline (Chantix Continuing Month Box) 1 mg tablet Take 1 tablet (1 mg) by mouth 2 times a day. (Patient not taking: Reported on 8/12/2024) 180 tablet 0    varenicline (Chantix Starting Month Box) 0.5 mg (11)- 1 mg (42) tablet Take 0.5 mg by mouth 2 times a day. (Patient not taking: Reported on 8/12/2024) 60 each 1    [DISCONTINUED] amLODIPine (Norvasc) 5 mg tablet Take 1 tablet (5 mg) by mouth 2 times a day. 180 tablet 3    [DISCONTINUED] azelastine (Optivar) 0.05 % ophthalmic solution Administer 1 drop into both eyes 2 times a day. 3 mL 3    [DISCONTINUED] fluticasone (Flonase) 50 mcg/actuation nasal spray Administer 2 sprays into each nostril once daily. 16 g 3    [DISCONTINUED] hydroCHLOROthiazide (HYDRODiuril) 25 mg tablet Take 1 tablet (25 mg) by mouth once daily. 90 tablet 3    [DISCONTINUED] hydroxychloroquine (Plaquenil) 200 mg tablet Take 1 tablet (200 mg) by mouth once daily.      [DISCONTINUED] leflunomide (Arava) 20 mg tablet Take by mouth.      [DISCONTINUED] losartan (Cozaar) 50 mg tablet Take 2 tablets (100 mg) by mouth once daily. 90 tablet 3     No current facility-administered medications on file prior to visit.       New Concerns:    Lifestyle  Diet:  Healthy  Physical Activity: Not on file      Tobacco Use: Medium Risk (8/12/2024)    Patient History     Smoking  "Tobacco Use: Former     Smokeless Tobacco Use: Never     Passive Exposure: Not on file      Alcohol Use: Not on file      Depression: Not at risk (6/26/2024)    [facility]    PHQ-2     PHQ-2 score: 1      Stress: Not on file       Consultants:        Colorectal Screening:   colonoscopy  Date: 11/24    Breast Screening: Due - Ordered today  History of abnormal mammogram: yes - abnormal  mammogram on left side  Family history of breast cancer: no    Pap Smear: Not indicated due to age  S/p hysterectmoy do to  uncontrollable bleeding  Abnormal uterine bleeding: denies    Urinary incontinence: occasionally    Dexa Scan: Up to date  7/24    Review of Systems   Review of systems: No frequent nosebleeds.  Patient denies dyspnea, denies symptoms of respiratory infection, denies chest pain,  Constitutional: Not feeling tired and no fever  Eyes: No eyesight problems, no redness and no pain.  ENT: No ear pain, no swollen lymph nodes, no sore throat, no nasal discharge and nasal congestion.  Cardiovascular: No chest pain, no palpitations and no lower extremity edema.  Respiratory: No cough dyspnea with exertion, no dyspnea at rest and no wheezing.  Gastrointestinal: No abdominal pain, no constipation, no diarrhea, no heartburn had no nausea.  Genitourinary: No dysuria, no hesitancy, normal urinary frequency.  Musculoskeletal: Arthralgias     yes  myalgias and    yes  joint swelling  Integumentary: No skin lesions and no rashes.  Neurologic: Dizziness but no headache no numbness or tingling, no seizures, no limb weakness, no memory changes and had no speech difficulty  Psychiatric: No mood changes, yes  sleep disturbances, no substance use and no feelings of anxiety.  Endocrine: No weight change and no temperature intolerance.  Hematologic/lymphatic: No easy bruising and no swollen glands         Objective   BP (!) 154/92   Pulse 100   Temp 36.1 °C (97 °F)   Resp 18   Ht 1.651 m (5' 5\")  "  Wt 56.2 kg (124 lb)   SpO2 98%   BMI 20.63 kg/m²     Physical Exam  Vitals: I have reviewed the vitals  General: Well-developed.  In no acute distress.  Eyes:   sclera nonicteric.  Conjunctiva not injected.  No discharge.   HEAD: Normocephalic, atraumatic.  HEENT   Mucous membranes moist.  Posterior oropharynx nonerythematous, no tonsillar exudates.      No cervical lymphadenopathy.  Cardio: Regular rate and rhythm.  No murmur, rub or gallop.  Pulmonary: Lungs clear to auscultation in all fields.  No accessory muscle use.  GI/: Normal active bowel sounds.  Soft, nontender.  No masses or organomegaly appreciated.  Musculoskeletal: No gross deformities appreciated.    Neuro: Alert, age-appropriate.  Normal muscle tone.  Moving all extremities.  Skin: No rash, bruises or lesions.    [facility]  Outside Information      suggestion  Information displayed in this report may not trend or trigger automated decision support.      Contains abnormal data COMPREHENSIVE METABOLIC PANEL  Order: [id number]  Component  Ref Range & Units 3 d ago   Protein, Total  6.3 - 8.0 g/dL 7.2   Albumin  3.9 - 4.9 g/dL 4.5   Calcium, Total  8.5 - 10.2 mg/dL 9.7   Bilirubin, Total  0.2 - 1.3 mg/dL 0.4   Alkaline Phosphatase  34 - 123 U/L 75   AST  13 - 35 U/L 26   ALT  7 - 38 U/L 29   Glucose  74 - 99 mg/dL 111 High    Comment: The American Diabetes Association (ADA) provides guidance for cutoff values for fasting glucose and random glucose. The ADA defines fasting as no caloric intake for at least 8 hours. Fasting plasma glucose results between 100 to 125 mg/dL indicate increased risk for diabetes (prediabetes).  Fasting plasma glucose results greater than or equal to 126 mg/dL meet the criteria for diagnosis of diabetes. In the absence of unequivocal hyperglycemia, results should be confirmed by repeat testing. In a patient with classic symptoms of hyperglycemia or hyperglycemic crisis, random plasma glucose results greater than or  equal to 200 mg/dL meet the criteria for diagnosis of diabetes.  Reference: Standards of Medical Care in Diabetes 2016, American Diabetes Association. Diabetes Care. 2016.39(Suppl 1).   BUN  7 - 21 mg/dL 13   Creatinine  0.58 - 0.96 mg/dL 0.80   Sodium  136 - 144 mmol/L 132 Low    Potassium  3.7 - 5.1 mmol/L 4.5   Chloride  98 - 107 mmol/L 99   CO2  22 - 30 mmol/L 25   Anion Gap  8 - 15 mmol/L 8   Estimated Glomerular Filtration Rate  >=60 mL/min/1.73m² 82     [facility]  Outside Information      suggestion  Information displayed in this report may not trend or trigger automated decision support.      Contains abnormal data COMPLETE BLOOD COUNT AND DIFFERENTIAL  Order: [id number]  Component  Ref Range & Units 3 d ago   WBC  3.70 - 11.00 k/uL 6.75   RBC  3.90 - 5.20 m/uL 3.18 Low    Hemoglobin  11.5 - 15.5 g/dL 10.4 Low    Hematocrit  36.0 - 46.0 % 30.6 Low    MCV  80.0 - 100.0 fL 96.2   MCH  26.0 - 34.0 pg 32.7   MCHC  30.5 - 36.0 g/dL 34.0   RDW-CV  11.5 - 15.0 % 12.4   Platelet Count  150 - 400 k/uL 472 High    MPV  9.0 - 12.7 fL 8.3 Low    Neutrophils %  % 68.4   Abs Neut  1.45 - 7.50 k/uL 4.61   Lymphocytes %  % 20.1   Abs Lymph  1.00 - 4.00 k/uL 1.36   Monocytes %  % 9.2   Abs Mono  <0.87 k/uL 0.62   Eosinophils %  % 1.3   Abs Eosin  <0.46 k/uL 0.09   Basophils %  % 0.3   Abs Baso  <0.11 k/uL <0.03   Immature Granulocytes %  % 0.7   Abs Immature Gran  <0.10 k/uL 0.05   NRBC  /100 WBC 0.0   Absolute nRBC  <0.01 k/uL <0.01   Diff Type Auto   ains abnormal data IRON AND TIBC  Order: [id number]  Component  Ref Range & Units 3 d ago   Iron  41 - 186 ug/dL 44   TIBC  232 - 386 ug/dL 402 High    Transferrin Saturation  15.0 - 57.0 % 10.9 Low    Resulting Agency [facility]   [facility]  Outside Information      suggestion  Information displayed in this report may not trend or trigger automated decision support.     FERRITIN  Order: [id number]  Component  Ref Range & Units 3 d ago   Ferritin  14.7 - 205.1  ng/mL 57.0     Specimen Collected: 08/09/24 11:32       Assessment/Plan   Problem List Items Addressed This Visit       Hypertension     White  coat  and stable with treatment          Relevant Medications    amLODIPine (Norvasc) 5 mg tablet    losartan (Cozaar) 50 mg tablet    hydroCHLOROthiazide (HYDRODiuril) 25 mg tablet    Rheumatoid arthritis (Multi)     stable and continue meds           Allergic rhinitis    Relevant Medications    fluticasone (Flonase) 50 mcg/actuation nasal spray    azelastine (Optivar) 0.05 % ophthalmic solution    Spinal stenosis of lumbar region without neurogenic claudication     Hold Cozaar morning of surgery  Discontinue all aspirin, vitamins, anti-inflammatories, 1 week before surgery    Reviewed  and low risk for  intended procedure  ..  Avoid  nsaids  in light of  anemia             Rheumatoid arthritis involving both feet (Multi)    Iron deficiency anemia - Primary     Reviewed labs and advised to take vitamin C along with Feosol over-the-counter 1 tablet twice weekly increase iron in diet with broccoli/eggs/spinach/         Relevant Medications    ferrous sulfate, 325 mg ferrous sulfate, tablet       Reviewed Social Determinants of health with patient, discussed healthy lifestyle including 150 minutes of physical activity per week  Ordered/Reviewed baseline labwork -CBC, CMP, Lipid Panel  Immunizations:  p  20p  given   Pap smear Up-to-Date, most recent s/physterectomy    bleeding   with secondary treatment  Mammogram ordered  Dexa 2024   osteopenia  Colorectal Screen to be done  11/24

## 2024-08-21 ENCOUNTER — APPOINTMENT (OUTPATIENT)
Dept: PRIMARY CARE | Facility: CLINIC | Age: 64
End: 2024-08-21
Payer: COMMERCIAL

## 2024-09-06 ENCOUNTER — PATIENT OUTREACH (OUTPATIENT)
Dept: PRIMARY CARE | Facility: CLINIC | Age: 64
End: 2024-09-06
Payer: COMMERCIAL

## 2024-09-06 NOTE — PROGRESS NOTES
Discharge Facility: [facility]  Discharge Diagnosis: Rectal bleed  Admission Date: 9/3/2024  Discharge Date: 9/5/2024    PCP Appointment Date:  -9/11/2024 1400    Specialist Appointment Date:   -9/9/2024 1100 blood work  -9/13/2024 colorectal surgery  -9/18/2024 1300 CT radiology  -9/19/2024 1100 transplant center  -9/25/2024 spinal stenosis of lumbar region neurogenic claudication; back surgery    Hospital Encounter and Summary Linked: Yes    See discharge assessment below for further details    Engagement  Call Start Time: 1048 (9/6/2024 10:48 AM)    Medications  Medications reviewed with patient/caregiver?: Yes (9/6/2024 10:48 AM)  Does the patient have all medications ordered at discharge?: Not applicable (9/6/2024 10:48 AM)  Care Management Interventions: No intervention needed (9/6/2024 10:48 AM)  Is the patient taking all medications as directed (includes completed medication regime)?: Yes (9/6/2024 10:48 AM)    Appointments  Does the patient have a primary care provider?: Yes (9/6/2024 10:48 AM)  Care Management Interventions: Verified appointment date/time/provider (9/6/2024 10:48 AM)  Has the patient kept scheduled appointments due by today?: Yes (9/6/2024 10:48 AM)    Self Management  Has home health visited the patient within 72 hours of discharge?: Not applicable (9/6/2024 10:48 AM)    Patient Teaching  Does the patient have access to their discharge instructions?: Yes (9/6/2024 10:48 AM)  Care Management Interventions: Reviewed instructions with patient (9/6/2024 10:48 AM)  What is the patient's perception of their health status since discharge?: Improving (9/6/2024 10:48 AM)  Is the patient/caregiver able to teach back the hierarchy of who to call/visit for symptoms/problems? PCP, Specialist, Home Health nurse, Urgent Care, ED, 911: Yes (9/6/2024 10:48 AM)    Wrap Up  Wrap Up Additional Comments: Pt was admitted to [facility] 9/3-9/5/2024 for rectal bleed. Pt  reports that there was a precancerous mass that was removed and then she started bleeding. Per notes pt had outpatient EGD/colonscopy 9/3 in which polypoid lesion was removed clipped and cauterized. She was sent home but returned to the ER with BRBPR. Pt had syncopal episode in ED. Pt went back for repeat scope during which clips were placed and two active arterial bleeds were noted and coagulated for hemostasis. Pt reports her blood pressure medications were stopped during hospitalization but she was to return to her regular medicine at home. Pt reports blood pressure this morning was 124/68. Pt states she will be taking blood pressure readings twice a day as instructed. Pt denies any light headedness, dizziness, or bleeding since discharge. Pt is going for repeat lab work Monday 9/9/2024. Pt has specialist appts scheduled and states she will see colorectal surgery Friday. Pt reports her back surgery was rescheduled for 9/25. Verified pt PCP appt 9/11/2024 1400. Pt was discharged with no new prescriptions. She reports understanding of discharge instructions. Pt denies any questions, needs, or concerns at this time. She is encouraged to call if questions or needs arise. (9/6/2024 10:48 AM)  Call End Time: 1103 (9/6/2024 10:48 AM)

## 2024-09-11 ENCOUNTER — APPOINTMENT (OUTPATIENT)
Dept: PRIMARY CARE | Facility: CLINIC | Age: 64
End: 2024-09-11
Payer: COMMERCIAL

## 2024-09-11 VITALS
OXYGEN SATURATION: 98 % | TEMPERATURE: 97 F | HEART RATE: 98 BPM | DIASTOLIC BLOOD PRESSURE: 70 MMHG | SYSTOLIC BLOOD PRESSURE: 124 MMHG | HEIGHT: 65 IN | WEIGHT: 129 LBS | RESPIRATION RATE: 18 BRPM | BODY MASS INDEX: 21.49 KG/M2

## 2024-09-11 DIAGNOSIS — K62.1 RECTAL POLYP: Primary | ICD-10-CM

## 2024-09-11 DIAGNOSIS — D50.9 IRON DEFICIENCY ANEMIA, UNSPECIFIED IRON DEFICIENCY ANEMIA TYPE: ICD-10-CM

## 2024-09-11 DIAGNOSIS — I10 HYPERTENSION, UNSPECIFIED TYPE: ICD-10-CM

## 2024-09-11 DIAGNOSIS — E87.1 HYPONATREMIA: ICD-10-CM

## 2024-09-11 PROBLEM — K62.5 BRBPR (BRIGHT RED BLOOD PER RECTUM): Status: ACTIVE | Noted: 2024-09-03

## 2024-09-11 PROBLEM — D62 ACUTE BLOOD LOSS ANEMIA: Status: ACTIVE | Noted: 2024-09-05

## 2024-09-11 PROBLEM — D64.9 SYMPTOMATIC ANEMIA: Status: ACTIVE | Noted: 2024-09-04

## 2024-09-11 PROCEDURE — 3078F DIAST BP <80 MM HG: CPT | Performed by: FAMILY MEDICINE

## 2024-09-11 PROCEDURE — 1160F RVW MEDS BY RX/DR IN RCRD: CPT | Performed by: FAMILY MEDICINE

## 2024-09-11 PROCEDURE — 1159F MED LIST DOCD IN RCRD: CPT | Performed by: FAMILY MEDICINE

## 2024-09-11 PROCEDURE — 3008F BODY MASS INDEX DOCD: CPT | Performed by: FAMILY MEDICINE

## 2024-09-11 PROCEDURE — 99496 TRANSJ CARE MGMT HIGH F2F 7D: CPT | Performed by: FAMILY MEDICINE

## 2024-09-11 PROCEDURE — 3074F SYST BP LT 130 MM HG: CPT | Performed by: FAMILY MEDICINE

## 2024-09-11 ASSESSMENT — ENCOUNTER SYMPTOMS
BRUISES/BLEEDS EASILY: 0
CONSTIPATION: 0
WHEEZING: 1
FEVER: 0
PALPITATIONS: 0
NECK PAIN: 0
TROUBLE SWALLOWING: 0
FATIGUE: 1
POLYPHAGIA: 0
SEIZURES: 0
DIARRHEA: 0
ARTHRALGIAS: 1
UNEXPECTED WEIGHT CHANGE: 0
ABDOMINAL DISTENTION: 0
BACK PAIN: 1
BLOOD IN STOOL: 0
DIFFICULTY URINATING: 0
CHEST TIGHTNESS: 0
CHILLS: 0
AGITATION: 0
NERVOUS/ANXIOUS: 0
LIGHT-HEADEDNESS: 0
SINUS PAIN: 0
POLYDIPSIA: 0
DYSURIA: 0
SPEECH DIFFICULTY: 0
ADENOPATHY: 0
CONFUSION: 0
EYE REDNESS: 0
SHORTNESS OF BREATH: 1
DIZZINESS: 0

## 2024-09-11 NOTE — PATIENT INSTRUCTIONS

## 2024-09-11 NOTE — ASSESSMENT & PLAN NOTE
To colorectal  surgery for    opinion   reviewed   biopsy report  and  7.0 cm   in size with  . Rectal polyp, excision:  -   Polyp with mixed features of conventional tubulovillous adenoma and traditional serrated adenoma with extensive areas of high-grade dysplasia and small focus suspicious, but not diagnostic, for invasive carcinoma (see comment).

## 2024-09-11 NOTE — PROGRESS NOTES
"Patient: Lana Piña  : 1960  PCP: Louis Juárez DO  MRN: [id number]  Program: Transitional Care Management  Status: Enrolled  Effective Dates: 2024 - present  Responsible Staff: Evita Lagos RN  Social Determinants to be Addressed: No information to display         Lana Piña is a 64 y.o. female presenting today for follow-up after being discharged from the hospital 6 days ago. The main problem requiring admission was This 64-year-old white female was admitted after colonoscopy/EGD with large polypoid lesion removed clipped cauterized on 9/3/2024.  Patient was sent home but returned to the emergency department with bright red blood per rectum.  She had a syncopal episode while being in the emergency department.  She was taken to the endoscopy suite and colonoscopy performed with clips placed and 2 active arterial bleeds were noted anticoagulated with hemostasis.  Patient was admitted to the ICU where 2 units of red blood cells for received with close monitoring.  Vitals were noted to be stable diet was advanced to clear liquids.  She was noted to have sinus tach and metoprolol was added.  Improvement with transferred to the regular floor and advancing of diet with resolution of bloody stools Nabel discharged in stable clinical state with advised to recheck with PCP.  At the time of discharge blood pressure was 130/68.  CAT scan of and pelvis dated 2024 showed liver no mass biliary tree without ductal dilatation spleen no mass or splenomegaly pancreas without mass or ductal dilatation small hiatal hernia was noted with wall thickening of the rectum with surgical clips in place immediately superior to the level of surgical clips no abdominal or pelvic adenopathy was appreciated with ascites or fluid collection.. The discharge summary and/or Transitional Care Management documentation was reviewed. Medication reconciliation was performed as indicated via the \"Helio as Reviewed\" timestamp. " "Lana Piña was contacted by Transitional Care Management services two days after her discharge. This encounter and supporting documentation was reviewed.    Review of Systems   Constitutional:  Positive for fatigue. Negative for chills, fever and unexpected weight change.   HENT:  Negative for congestion, ear pain, hearing loss, nosebleeds, sinus pain and trouble swallowing.    Eyes:  Negative for redness and visual disturbance.   Respiratory:  Positive for shortness of breath and wheezing. Negative for chest tightness.    Cardiovascular:  Negative for chest pain and palpitations.   Gastrointestinal:  Negative for abdominal distention, blood in stool, constipation and diarrhea.   Endocrine: Negative for cold intolerance, heat intolerance, polydipsia, polyphagia and polyuria.   Genitourinary:  Negative for difficulty urinating, dysuria and urgency.   Musculoskeletal:  Positive for arthralgias and back pain. Negative for neck pain.   Skin:  Negative for rash.   Neurological:  Negative for dizziness, seizures, syncope, speech difficulty and light-headedness.   Hematological:  Negative for adenopathy. Does not bruise/bleed easily.   Psychiatric/Behavioral:  Negative for agitation and confusion. The patient is not nervous/anxious.        /70   Pulse 98   Temp 36.1 °C (97 °F)   Resp 18   Ht 1.651 m (5' 5\")   Wt 58.5 kg (129 lb)   SpO2 98%   BMI 21.47 kg/m²     Physical Exam  Vitals: I have reviewed the vitals  General: Well-developed.  In no acute distress.  Eyes:   sclera nonicteric.  Conjunctiva not injected.  No discharge.   HEAD: Normocephalic, atraumatic.  HEENT   Mucous membranes moist.  Posterior oropharynx nonerythematous, no tonsillar exudates.      No cervical lymphadenopathy.  Cardio: Regular rate and rhythm.  No murmur, rub or gallop.  Pulmonary: Lungs clear to auscultation in all fields.  No accessory muscle use.  GI/: Normal active bowel sounds.  Soft, nontender.  No masses or " organomegaly appreciated.  Musculoskeletal: No gross deformities appreciated.  Neuro: Alert, age-appropriate.  Normal muscle tone.  Moving all extremities.  Skin: No rash, bruises or lesions. v  The complexity of medical decision making for this patient's transitional care is high.  Review of lab testing showed 9/9/2024 with hemoglobin 11.1 hematocrit 33.0 WBC 7.11 with platelets 372,000 the CMP on 9/9/2024 showed normal liver function studies with sodium remarkable for slightly decreased at 133 with GFR greater than 90 CEA the same date was 5.5   Assessment/Plan   Problem List Items Addressed This Visit             ICD-10-CM    Hypertension I10     Continue  amlodipine...     losartan      and    hydrochlorothiazide   stable and continue meds          Rectal polyp - Primary K62.1     To colorectal  surgery for    opinion   reviewed   biopsy report  and  7.0 cm   in size with  . Rectal polyp, excision:  -   Polyp with mixed features of conventional tubulovillous adenoma and traditional serrated adenoma with extensive areas of high-grade dysplasia and small focus suspicious, but not diagnostic, for invasive carcinoma (see comment).            Hyponatremia E87.1     Repeat bmp      suspect do to    diuretic          Relevant Orders    Basic Metabolic Panel    Iron deficiency anemia D50.9      Hgb  9.3  ..  hct 27.2  ...   Rechk 11.1   continue iron  twice   weekly            Relevant Orders    CBC   In order to see the patient ,preparation to see the patient ,that is review of tests, obtaining and/or reviewing separately obtained history, performing a medically appropriate examination and/or evaluation, counseling and educating the patient/family/caregiver, and ordering medications, tests or procedures was performed.  In addition referring and communicating with other healthcare professionals (without separately reported) and documenting clinical information in the electronic or other health record, and finally  independently interpreting results (not separately reported) and communicated results of the patient/family/caregiver required 29  minutes...

## 2024-09-13 ENCOUNTER — PATIENT OUTREACH (OUTPATIENT)
Dept: PRIMARY CARE | Facility: CLINIC | Age: 64
End: 2024-09-13
Payer: COMMERCIAL

## 2024-09-13 NOTE — PROGRESS NOTES
Call regarding appt. with PCP on 9/11/2024 after hospitalization.  Pt states she is currently at an office waiting for an appt. Pt states she has everything taken care.   She denies any questions, needs, or concerns at this time. Pt encouraged to call if questions or needs arise.    
1.71

## 2025-05-29 DIAGNOSIS — M06.9 RHEUMATOID ARTHRITIS, INVOLVING UNSPECIFIED SITE, UNSPECIFIED WHETHER RHEUMATOID FACTOR PRESENT (MULTI): ICD-10-CM

## 2025-05-30 DIAGNOSIS — I10 HYPERTENSION, UNSPECIFIED TYPE: Primary | ICD-10-CM

## 2025-05-30 RX ORDER — MELOXICAM 15 MG/1
15 TABLET ORAL DAILY
Qty: 90 TABLET | Refills: 0 | Status: SHIPPED | OUTPATIENT
Start: 2025-05-30

## 2025-08-22 DIAGNOSIS — I10 HYPERTENSION, UNSPECIFIED TYPE: ICD-10-CM

## 2025-08-22 DIAGNOSIS — M06.9 RHEUMATOID ARTHRITIS, INVOLVING UNSPECIFIED SITE, UNSPECIFIED WHETHER RHEUMATOID FACTOR PRESENT (MULTI): ICD-10-CM

## 2025-08-25 RX ORDER — MELOXICAM 15 MG/1
15 TABLET ORAL DAILY
Qty: 90 TABLET | Refills: 0 | Status: SHIPPED | OUTPATIENT
Start: 2025-08-25

## 2025-08-25 RX ORDER — HYDROCHLOROTHIAZIDE 25 MG/1
25 TABLET ORAL DAILY
Qty: 90 TABLET | Refills: 0 | Status: SHIPPED | OUTPATIENT
Start: 2025-08-25

## 2025-08-27 DIAGNOSIS — I10 HYPERTENSION, UNSPECIFIED TYPE: ICD-10-CM

## 2025-08-28 DIAGNOSIS — E87.1 HYPONATREMIA: ICD-10-CM

## 2025-08-28 DIAGNOSIS — I10 HYPERTENSION, UNSPECIFIED TYPE: ICD-10-CM

## 2025-08-28 DIAGNOSIS — K62.1 RECTAL POLYP: ICD-10-CM

## 2025-08-28 DIAGNOSIS — D75.89 MACROCYTOSIS: ICD-10-CM

## 2025-08-28 DIAGNOSIS — M19.041 ARTHRITIS OF HAND, RIGHT: Primary | ICD-10-CM

## 2025-08-28 RX ORDER — AMLODIPINE BESYLATE 5 MG/1
5 TABLET ORAL 2 TIMES DAILY
Qty: 180 TABLET | Refills: 0 | Status: SHIPPED | OUTPATIENT
Start: 2025-08-28

## 2025-08-28 RX ORDER — LOSARTAN POTASSIUM 50 MG/1
100 TABLET ORAL DAILY
Qty: 180 TABLET | Refills: 0 | Status: SHIPPED | OUTPATIENT
Start: 2025-08-28

## 2025-09-02 ENCOUNTER — TELEPHONE (OUTPATIENT)
Dept: PRIMARY CARE | Facility: CLINIC | Age: 65
End: 2025-09-02
Payer: COMMERCIAL

## 2025-10-07 ENCOUNTER — APPOINTMENT (OUTPATIENT)
Dept: PRIMARY CARE | Facility: CLINIC | Age: 65
End: 2025-10-07
Payer: COMMERCIAL